# Patient Record
Sex: FEMALE | Race: WHITE
[De-identification: names, ages, dates, MRNs, and addresses within clinical notes are randomized per-mention and may not be internally consistent; named-entity substitution may affect disease eponyms.]

---

## 2022-11-30 ENCOUNTER — HOSPITAL ENCOUNTER (EMERGENCY)
Dept: HOSPITAL 33 - ED | Age: 52
LOS: 1 days | Discharge: HOME | End: 2022-12-01
Payer: COMMERCIAL

## 2022-11-30 DIAGNOSIS — R11.2: ICD-10-CM

## 2022-11-30 DIAGNOSIS — Z72.0: ICD-10-CM

## 2022-11-30 DIAGNOSIS — N39.0: ICD-10-CM

## 2022-11-30 DIAGNOSIS — K59.00: ICD-10-CM

## 2022-11-30 DIAGNOSIS — Z28.310: ICD-10-CM

## 2022-11-30 DIAGNOSIS — K56.7: Primary | ICD-10-CM

## 2022-11-30 LAB
ALBUMIN SERPL-MCNC: 4.4 G/DL (ref 3.5–5)
ALP SERPL-CCNC: 58 U/L (ref 38–126)
ALT SERPL-CCNC: 16 U/L (ref 0–35)
AMYLASE SERPL-CCNC: 49 U/L (ref 30–110)
ANION GAP SERPL CALC-SCNC: 8.4 MEQ/L (ref 5–15)
AST SERPL QL: 16 U/L (ref 14–36)
BASOPHILS # BLD AUTO: 0.06 X10^3/UL (ref 0–0.4)
BILIRUB BLD-MCNC: 0.6 MG/DL (ref 0.2–1.3)
BUN SERPL-MCNC: 7 MG/DL (ref 7–17)
CALCIUM SPEC-MCNC: 8.9 MG/DL (ref 8.4–10.2)
CHLORIDE SERPL-SCNC: 105 MMOL/L (ref 98–107)
CO2 SERPL-SCNC: 28 MMOL/L (ref 22–30)
CREAT SERPL-MCNC: 0.62 MG/DL (ref 0.52–1.04)
EOSINOPHIL # BLD AUTO: 0.15 X10^3/UL (ref 0–0.5)
GFR SERPLBLD BASED ON 1.73 SQ M-ARVRAT: > 60 ML/MIN
GLUCOSE SERPL-MCNC: 114 MG/DL (ref 74–106)
GLUCOSE UR-MCNC: NEGATIVE MG/DL
HCT VFR BLD AUTO: 41.5 % (ref 35–47)
HGB BLD-MCNC: 13.5 G/DL (ref 12–16)
LIPASE SERPL-CCNC: 44 U/L (ref 23–300)
LYMPHOCYTES # SPEC AUTO: 0.92 X10^3/UL (ref 1–4.6)
MCH RBC QN AUTO: 29.7 PG (ref 26–32)
MCHC RBC AUTO-ENTMCNC: 32.5 G/DL (ref 32–36)
MONOCYTES # BLD AUTO: 0.68 X10^3/UL (ref 0–1.3)
PLATELET # BLD AUTO: 342 X10^3/UL (ref 150–450)
POTASSIUM SERPLBLD-SCNC: 4.2 MMOL/L (ref 3.5–5.1)
PROT SERPL-MCNC: 6.9 G/DL (ref 6.3–8.2)
PROT UR STRIP-MCNC: NEGATIVE MG/DL
RBC # BLD AUTO: 4.54 X10^6/UL (ref 4.1–5.4)
RBC # UR AUTO: NEGATIVE ERY/UL (ref 0–5)
RBC #/AREA URNS HPF: (no result) /HPF (ref 0–2)
SODIUM SERPL-SCNC: 137 MMOL/L (ref 137–145)
UA DIPSTICK PNL UR: (no result)
URINE CULTURED INDICATED?: YES
WBC # BLD AUTO: 6.5 X10^3/UL (ref 4–10.5)

## 2022-11-30 PROCEDURE — 74176 CT ABD & PELVIS W/O CONTRAST: CPT

## 2022-11-30 PROCEDURE — 0241U: CPT

## 2022-11-30 PROCEDURE — 85025 COMPLETE CBC W/AUTO DIFF WBC: CPT

## 2022-11-30 PROCEDURE — 83690 ASSAY OF LIPASE: CPT

## 2022-11-30 PROCEDURE — 99284 EMERGENCY DEPT VISIT MOD MDM: CPT

## 2022-11-30 PROCEDURE — 36415 COLL VENOUS BLD VENIPUNCTURE: CPT

## 2022-11-30 PROCEDURE — 96374 THER/PROPH/DIAG INJ IV PUSH: CPT

## 2022-11-30 PROCEDURE — 96365 THER/PROPH/DIAG IV INF INIT: CPT

## 2022-11-30 PROCEDURE — 96375 TX/PRO/DX INJ NEW DRUG ADDON: CPT

## 2022-11-30 PROCEDURE — 82150 ASSAY OF AMYLASE: CPT

## 2022-11-30 PROCEDURE — 81015 MICROSCOPIC EXAM OF URINE: CPT

## 2022-11-30 PROCEDURE — 96361 HYDRATE IV INFUSION ADD-ON: CPT

## 2022-11-30 PROCEDURE — 87086 URINE CULTURE/COLONY COUNT: CPT

## 2022-11-30 PROCEDURE — 80053 COMPREHEN METABOLIC PANEL: CPT

## 2022-11-30 PROCEDURE — 36000 PLACE NEEDLE IN VEIN: CPT

## 2022-11-30 NOTE — ERPHSYRPT
- History of Present Illness


Time Seen by Provider: 11/30/22 22:55


Historian: patient, family


Exam Limitations: no limitations


Patient Subjective Stated Complaint: pt states she had an a&p repair on 11/10 

with dr hung. states since then she has not been able to have a normal bm. 

states she has had only very small bm's and only after laxatives/enemas. pt has 

been having increased abd discomfort and some back pain. states she feels 

pressuer in the epigastric area


Triage Nursing Assessment: pt alert and oriented, answers questions approp. pt a

mbulatory with steady gait noted. respirations nonlabored. skin warm and dry. 

abd soft with some tenderness reportedw ith palpation. bowel sounds hypo. pt 

reports no recent flatus.


Physician History: 





This a 52-year-old overweight white female patient who underwent AMP repair on 

November 10 by Dr. Hung in [facility].  Since her surgery, the 

patient has noticed constipation.  She does not ordinarily have constipation.  

She completed her narcotic treatment 4 days after her procedure.  She tried to 

increase her oral intake, increase her activity.  She is use Dulcolax, enemas 

and MiraLAX without much effect.  Today she used MiraLAX.  She had episodes of 

vomiting earlier this afternoon and early evening.  She feels as though she is 

becoming more distended in her abdomen.  She had a history of what she describes

as a gastric outlet obstruction.  She also has known history of a hiatal hernia.

 She has no chest pain.  She has no flulike symptoms


Timing/Duration: day(s)


Quality: cramping


Abdominal Pain Onset Location: generalized abdomen


Severity of Pain-Max: moderate


Severity of Pain-Current: moderate


Modifying Factors: Improves With: vomiting


Associated Symptoms: nausea, vomiting


Previous symptoms: same symptoms as today, no recent treatment


Allergies/Adverse Reactions: 








Iodinated Contrast Media Allergy (Severe, Verified 11/30/22 23:00)


   Tightness of Throat


moxifloxacin [From Avelox] Allergy (Severe, Verified 11/30/22 23:00)


   Anaphylactic Reaction


fluconazole [From Diflucan] Allergy (Intermediate, Verified 11/30/22 23:00)


   Rash





Hx Tetanus, Diphtheria Vaccination/Date Given: Yes


Hx Influenza Vaccination/Date Given: Yes


Hx Pneumococcal Vaccination/Date Given: No


Immunizations Up to Date: Yes





Travel Risk





- International Travel


Have you traveled outside of the country in past 3 weeks: No





- Coronavirus Screening


Are you exhibiting any of the following symptoms?: Yes


Symptoms: Vomiting/Diarrhea, Headaches/Body Aches/Fatigue


Close contact with a COVID-19 positive Pt in past 14-21 Days: No





- Vaccine Status


Have you recieved a Covid-19 vaccination: No





- Review of Systems


Constitutional: No Symptoms


Eyes: No Symptoms


Ears, Nose, & Throat: No Symptoms


Respiratory: No Symptoms


Cardiac: No Symptoms


Abdominal/Gastrointestinal: Abdominal Pain, Nausea, Vomiting, Constipation


Genitourinary Symptoms: No Symptoms


Musculoskeletal: No Symptoms


Skin: No Symptoms


Neurological: No Symptoms


Psychological: No Symptoms


Endocrine: No Symptoms


Hematologic/Lymphatic: No Symptoms


Immunological/Allergic: No Symptoms


All Other Systems: Reviewed and Negative





- Past Medical History


Pertinent Past Medical History: Yes


Other Medical History: rhuematoid arthritis, stricture esophagus, blockage 

atstomach/small bowel. mvp, fibromayalgia





- Past Surgical History


Past Surgical History: Yes


Gastrointestinal: Cholecystectomy


Musculoskeletal: Orthopedic Surgery


Female Surgical History: Hysterectomy, Dilation & Curettage


Other Surgical History: A&p repair 11/10/22, rt ankle surgery, mult d&c's,.  

mult egd's, partial hyster, sinus surgery and tonsils 2007





- Social History


Smoking Status: Current some day smoker


Exposure to second hand smoke: No


Drug Use: none


Patient Lives Alone: No





- Nursing Vital Signs


Nursing Vital Signs: 


                               Initial Vital Signs











Temperature  97.0 F   11/30/22 22:35


 


Pulse Rate  98 H  11/30/22 22:35


 


Respiratory Rate  18   11/30/22 22:35


 


Blood Pressure  142/75   11/30/22 22:35


 


O2 Sat by Pulse Oximetry  99   11/30/22 22:35








                                   Pain Scale











Pain Intensity                 2

















- Physical Exam


General Appearance: no apparent distress, alert, anxiety, obese


Eye Exam: PERRL/EOMI, eyes nml inspection


Ears, Nose, Throat Exam: normal ENT inspection, moist mucous membranes


Neck Exam: normal inspection, non-tender, supple, full range of motion


Respiratory Exam: normal breath sounds, lungs clear, airway intact, No chest 

tenderness, No respiratory distress


Cardiovascular Exam: regular rate/rhythm, normal heart sounds, normal peripheral

pulses


Gastrointestinal/Abdomen Exam: soft, tenderness (Diffuse to palpation), guarding

(Diffuse to palpation), other (Sluggish bowel sounds), No rebound


Pelvic Exam: not done


Rectal Exam: not done


Back Exam: normal inspection, normal range of motion


Extremity Exam: normal inspection, normal range of motion, pelvis stable


Neurologic Exam: alert, oriented x 3, cooperative, CNs II-XII nml as tested, 

normal mood/affect, nml cerebellar function, nml station & gait, sensation nml


Skin Exam: normal color, warm, dry


Lymphatic Exam: No adenopathy


**SpO2 Interpretation**: normal


SpO2: 99


O2 Delivery: Room Air





- Course


Nursing assessment & vital signs reviewed: Yes


Ordered Tests: 


                               Active Orders 24 hr











 Category Date Time Status


 


 IV Insertion STAT Care  11/30/22 23:15 Active


 


 ABDOMEN AND PELVIS W/0 CONTRAS [CT] Stat Exams  11/30/22 23:16 Taken


 


 AMYLASE Stat Lab  11/30/22 23:19 Completed


 


 CBC W DIFF Stat Lab  11/30/22 23:19 Completed


 


 CMP Stat Lab  11/30/22 23:19 Completed


 


 CULTURE,URINE Stat Lab  11/30/22 23:19 Received


 


 LIPASE Stat Lab  11/30/22 23:19 Completed


 


 Lactic Acid Stat Lab  11/30/22 23:15 Ordered


 


 UA W/RFX CULTURE Stat Lab  11/30/22 23:19 Completed








Medication Summary














Discontinued Medications














Generic Name Dose Route Start Last Admin





  Trade Name Freq  PRN Reason Stop Dose Admin


 


Hydromorphone HCl  1 mg  11/30/22 23:15  11/30/22 23:32





  Hydromorphone 1 Mg/1ml Inj*** 1 Mg/Ml Syringe  IV  11/30/22 23:16  1 mg





  STAT ONE   Administration


 


Hydromorphone HCl  Confirm  11/30/22 23:28 





  Hydromorphone 1 Mg/1ml Inj*** 1 Mg/Ml Syringe  Administered  11/30/22 23:29 





  Dose  





  1 mg  





  .ROUTE  





  .STK-MED ONE  


 


Sodium Chloride  1,000 mls @ 999 mls/hr  11/30/22 23:15  11/30/22 23:32





  Sodium Chloride 0.9% 1000 Ml  IV  12/01/22 00:15  999 mls/hr





  .Q1H1M STA   Administration


 


Sodium Chloride  Confirm  11/30/22 23:28 





  Sodium Chloride 0.9% 1000 Ml  Administered  11/30/22 23:29 





  Dose  





  1,000 mls @ ud  





  .ROUTE  





  .STK-MED ONE  


 


Ceftriaxone Sodium/Dextrose  1 g in 50 mls @ 100 mls/hr  12/01/22 00:01  

12/01/22 00:11





  Rocephin 1 Gm-D5w 50 Ml Bag**  IV  12/01/22 00:30  100 ml/hr





  STAT STA   100 mls/hr





    Administration


 


Ceftriaxone Sodium/Dextrose  Confirm  12/01/22 00:07 





  Rocephin 1 Gm-D5w 50 Ml Bag**  Administered  12/01/22 00:08 





  Dose  





  1 g in 50 mls @ ud  





  IV  





  .STK-MED ONE  


 


Ondansetron HCl  4 mg  11/30/22 23:06  11/30/22 23:09





  Ondansetron Hcl 4 Mg/2 Ml Vial  IV  11/30/22 23:07  4 mg





  STAT ONE   Administration


 


Ondansetron HCl  Confirm  11/30/22 23:07 





  Ondansetron Hcl 4 Mg/2 Ml Vial  Administered  11/30/22 23:08 





  Dose  





  4 mg  





  .ROUTE  





  .STK-MED ONE  











Lab/Rad Data: 


                           Laboratory Result Diagrams





                                 11/30/22 23:19 





                                 11/30/22 23:19 





                               Laboratory Results











  11/30/22 11/30/22 11/30/22 Range/Units





  23:19 23:19 23:19 


 


WBC    6.5  (4.0-10.5)  x10^3/uL


 


RBC    4.54  (4.1-5.4)  x10^6/uL


 


Hgb    13.5  (12.0-16.0)  g/dL


 


Hct    41.5  (35-47)  %


 


MCV    91.4  ()  fL


 


MCH    29.7  (26-32)  pg


 


MCHC    32.5  (32-36)  g/dL


 


RDW    13.6  (11.5-14.0)  %


 


Plt Count    342  (150-450)  x10^3/uL


 


MPV    9.5  (7.5-11.0)  fL


 


Gran %    71.6 H  (36.0-66.0)  %


 


Immature Gran % (Auto)    0.5 H  (0.00-0.4)  %


 


Nucleat RBC Rel Count    0.0  (0.00-0.1)  %


 


Eos # (Auto)    0.15  (0-0.5)  x10^3/uL


 


Immature Gran # (Auto)    0.03  (0.00-0.03)  x10^3u/L


 


Absolute Lymphs (auto)    0.92 L  (1.0-4.6)  x10^3/uL


 


Absolute Monos (auto)    0.68  (0.0-1.3)  x10^3/uL


 


Absolute Nucleated RBC    0.00  (0.00-0.01)  x10^3u/L


 


Lymphocytes %    14.2 L  (24.0-44.0)  %


 


Monocytes %    10.5  (0.0-12.0)  %


 


Eosinophils %    2.3  (0.00-5.0)  %


 


Basophils %    0.9  (0.0-0.4)  %


 


Absolute Granulocytes    4.62  (1.4-6.9)  x10^3/uL


 


Basophils #    0.06  (0-0.4)  x10^3/uL


 


Sodium   137   (137-145)  mmol/L


 


Potassium   4.2   (3.5-5.1)  mmol/L


 


Chloride   105   ()  mmol/L


 


Carbon Dioxide   28   (22-30)  mmol/L


 


Anion Gap   8.4   (5-15)  MEQ/L


 


BUN   7   (7-17)  mg/dL


 


Creatinine   0.62   (0.52-1.04)  mg/dL


 


Estimated GFR   > 60.0   ML/MIN


 


Glucose   114 H   ()  mg/dL


 


Calcium   8.9   (8.4-10.2)  mg/dL


 


Total Bilirubin   0.60   (0.2-1.3)  mg/dL


 


AST   16   (14-36)  U/L


 


ALT   16   (0-35)  U/L


 


Alkaline Phosphatase   58   ()  U/L


 


Serum Total Protein   6.9   (6.3-8.2)  g/dL


 


Albumin   4.4   (3.5-5.0)  g/dL


 


Amylase   49   ()  U/L


 


Lipase   44   ()  U/L


 


Urinalys Dipstick Clnc  MAIN LAB    


 


Urine Color  YELLOW    (YELLOW)  


 


Urine Appearance  CLEAR    (CLEAR)  


 


Urine pH  8.0    (5-6)  


 


Ur Specific Gravity  1.020    (1.005-1.025)  


 


POC Urine Protein Conf  NEGATIVE    (Negative)  


 


Urine Ketones  NEGATIVE    (NEGATIVE)  


 


Urine Nitrite  NEGATIVE    (NEGATIVE)  


 


Urine Bilirubin  NEGATIVE    (NEGATIVE)  


 


Urine Urobilinogen  0.2    (0-1)  mg/dL


 


Urine Leukocytes  SMALL A    (NEGATIVE)  


 


Urine WBC (Auto)  11-15 A    (0-5)  /HPF


 


Urine RBC (Auto)  0-2    (0-2)  /HPF


 


U Epithel Cells (Auto)  RARE    (FEW)  /HPF


 


Urine Bacteria (Auto)  RARE    (NEGATIVE)  /HPF


 


Urine RBC  NEGATIVE    (0-5)  Agustin/ul


 


Urine Mucus (Auto)  SLIGHT A    (NEGATIVE)  /HPF


 


Ur Culture Indicated?  YES    


 


Urine Glucose  NEGATIVE    (NEGATIVE)  mg/dL














- Progress


Progress: improved, pain not gone completely


Progress Note: 





12/01/22 02:28


CAT scan of the abdomen pelvis does not show any acute intra-abdominal or 

intrapelvic process.


Counseled pt/family regarding: lab results, diagnosis, need for follow-up, rad 

results





- Departure


Departure Disposition: Home


Clinical Impression: 


 UTI (urinary tract infection), Ileus, unspecified





Condition: Stable


Critical Care Time: No


Additional Instructions: 


Drink plenty of fluids.  May continue daily MiraLAX.  Take antibiotics as 

prescribed.  Follow-up with your prescribing physician and your surgeon later 

today by phone for further instructions and management.


Prescriptions: 


Cephalexin Mh 500 mg** [Keflex 500 mg**] 500 mg PO TID #21 cap

## 2022-12-01 VITALS — SYSTOLIC BLOOD PRESSURE: 124 MMHG | HEART RATE: 87 BPM | DIASTOLIC BLOOD PRESSURE: 71 MMHG | OXYGEN SATURATION: 97 %

## 2022-12-01 LAB
FLUAV AG NPH QL IA: NEGATIVE
FLUBV AG NPH QL IA: NEGATIVE
RSV AG SPEC QL IA: NEGATIVE
SARS-COV-2 AG RESP QL IA.RAPID: NEGATIVE

## 2022-12-04 NOTE — XRAY
Exam: CT of the abdomen and pelvis without IV contrast from 11/30/2022.



CTDI: 14.98 mGy



Comparison: [None.]



Indication: 52-year-old female who is status post A and P repair on

11/10/2022.  She complains of constipation since recent surgery, as well as

upper abdominal and flank pain, nausea and vomiting, and distended/hard

abdomen.  The patient also gives a history of prior partial hysterectomy and

cholecystectomy.



Technique: Non-IV contrast axial images were obtained through the abdomen and

pelvis.  Reconstructed coronal and sagittal images were created and reviewed.



Findings: The visualized lung bases reveal a small calcified granuloma at the

posterior left lung base.  There is also a tiny 2 mm noncalcified nodule at

the lateral right lung base which may represent a small granuloma.  This is

too small to characterize.  The remainder the visualized lung bases appears

clear.  I note a small hiatal hernia.



The liver appears of unremarkable size.  Evaluation of the solid organs is

limited on a non-IV contrast study only.  I do not see any definite hepatic

mass or intrahepatic biliary duct distention.  Surgical clips consistent with

prior cholecystectomy are seen within the subhepatic space.  The common bile

duct does not appear abnormally distended.



The spleen is of normal size and reveals no definite mass.  Both the pancreas

and adrenal glands appear unremarkable.  The kidneys are of average size and

reveal no calculi, gross mass, or hydronephrosis.  The ureters are of normal

diameter and reveal no ureterolith.  A few small calcified phleboliths are

seen within the lower pelvis.  No definite urinary bladder stone is seen.



The abdominal aorta appears of normal diameter revealing no aneurysm or

abnormal retroperitoneal lymphadenopathy.  No abnormally enlarged mesenteric

lymph nodes are seen.  There appears to be a small splenule adjacent to the

medial aspect of the anterior portion of the spleen in the left upper quadrant

on axial image #26.  There is no free intraperitoneal air or bowel containing

ventral abdominal wall hernia.  Minimal protrusion of intraperitoneal fat into

the base of the umbilicus is seen on the midline sagittal images.



There is moderate stool seen within the cecum, ascending colon, and transverse

colon.  A lesser amount of stool is seen within the descending colon and

rectosigmoid colon.  No bowel obstruction or colon wall thickening is seen.

No diverticulitis is seen.  I see no evidence of appendicitis within the right

lower quadrant.  The appendix is in a retrocecal location.



The uterus is surgically absent.  The left ovary appears of normal size on

axial image #69.  I cannot exclude a small 1.9 cm cyst or follicle within the

left ovary on axial images #68 and #69.  The right ovary may be seen on axial

image #67 just anterior to the right psoas muscle.  It measures about 1.8 cm

in greatest length.  There urinary bladder is partially distended and appears

grossly unremarkable.  No abnormal pelvic lymphadenopathy/mass or free

intraperitoneal fluid is seen.



The skeleton reveals mild degenerative disc disease at L5-S1 and moderate

lower thoracic vertebral spondylosis.  No acute fracture or aggressive bone

lesion is seen.



Impression:



1.  No evidence of acute intra-abdominal or pelvic process is seen.



2.  Small hiatal hernia, evidence of prior cholecystectomy and hysterectomy,

possible small 1.9 cm cyst/follicle within the left ovary, moderate colonic

fecal retention, and mild degenerative disc disease at L5-S1 are seen.



3.  The appendix appears normal.